# Patient Record
Sex: FEMALE | Race: WHITE | URBAN - METROPOLITAN AREA
[De-identification: names, ages, dates, MRNs, and addresses within clinical notes are randomized per-mention and may not be internally consistent; named-entity substitution may affect disease eponyms.]

---

## 2018-12-03 ENCOUNTER — APPOINTMENT (RX ONLY)
Dept: URBAN - METROPOLITAN AREA CLINIC 16 | Facility: CLINIC | Age: 58
Setting detail: DERMATOLOGY
End: 2018-12-03

## 2018-12-03 DIAGNOSIS — L82.1 OTHER SEBORRHEIC KERATOSIS: ICD-10-CM

## 2018-12-03 DIAGNOSIS — Z85.828 PERSONAL HISTORY OF OTHER MALIGNANT NEOPLASM OF SKIN: ICD-10-CM

## 2018-12-03 PROBLEM — D48.5 NEOPLASM OF UNCERTAIN BEHAVIOR OF SKIN: Status: ACTIVE | Noted: 2018-12-03

## 2018-12-03 PROBLEM — L57.0 ACTINIC KERATOSIS: Status: ACTIVE | Noted: 2018-12-03

## 2018-12-03 PROCEDURE — ? COUNSELING

## 2018-12-03 PROCEDURE — 11100: CPT

## 2018-12-03 PROCEDURE — 99202 OFFICE O/P NEW SF 15 MIN: CPT | Mod: 25

## 2018-12-03 PROCEDURE — ? DIAGNOSIS COMMENT

## 2018-12-03 PROCEDURE — ? BIOPSY BY SHAVE METHOD

## 2018-12-03 ASSESSMENT — LOCATION SIMPLE DESCRIPTION DERM: LOCATION SIMPLE: RIGHT CHEEK

## 2018-12-03 ASSESSMENT — LOCATION DETAILED DESCRIPTION DERM: LOCATION DETAILED: RIGHT MEDIAL MALAR CHEEK

## 2018-12-03 ASSESSMENT — LOCATION ZONE DERM: LOCATION ZONE: FACE

## 2018-12-03 NOTE — PROCEDURE: BIOPSY BY SHAVE METHOD
Anesthesia Type: 1% lidocaine with epinephrine
Anesthesia Volume In Cc (Will Not Render If 0): 0.7
Lab: 6
Type Of Destruction Used: Curettage
Billing Type: Third-Party Bill
Biopsy Method: 15 blade
Notification Instructions: Patient will be notified of biopsy results. However, patient instructed to call the office if not contacted within 2 weeks.
Curettage Text: The wound bed was treated with curettage after the biopsy was performed.
Biopsy Type: H and E
Post-Care Instructions: I reviewed with the patient in detail post-care instructions. Patient is to keep the biopsy site dry overnight, and then apply Vaseline/Aquaphor followed by bandage daily until healed.
Additional Anesthesia Volume In Cc (Will Not Render If 0): 0
Dressing: bandage
Depth Of Biopsy: dermis
Electrodesiccation Text: The wound bed was treated with electrodesiccation after the biopsy was performed.
Silver Nitrate Text: The wound bed was treated with silver nitrate after the biopsy was performed.
Hemostasis: Drysol
Wound Care: Petrolatum
Cryotherapy Text: The wound bed was treated with cryotherapy after the biopsy was performed.
Was A Bandage Applied: Yes
Destruction After The Procedure: No
Consent: Written consent was obtained and risks were reviewed including but not limited to scarring, infection, bleeding, scabbing, incomplete removal, nerve damage and allergy to anesthesia.
Electrodesiccation And Curettage Text: The wound bed was treated with electrodesiccation and curettage after the biopsy was performed.
Detail Level: Detailed

## 2020-04-17 ENCOUNTER — APPOINTMENT (RX ONLY)
Dept: URBAN - METROPOLITAN AREA CLINIC 16 | Facility: CLINIC | Age: 60
Setting detail: DERMATOLOGY
End: 2020-04-17

## 2020-04-17 DIAGNOSIS — D485 NEOPLASM OF UNCERTAIN BEHAVIOR OF SKIN: ICD-10-CM

## 2020-04-17 PROBLEM — D48.5 NEOPLASM OF UNCERTAIN BEHAVIOR OF SKIN: Status: ACTIVE | Noted: 2020-04-17

## 2020-04-17 PROCEDURE — ? COUNSELING

## 2020-04-17 PROCEDURE — 99213 OFFICE O/P EST LOW 20 MIN: CPT

## 2020-04-17 PROCEDURE — ? DIAGNOSIS COMMENT

## 2020-04-17 PROCEDURE — ? ADDITIONAL NOTES

## 2020-04-17 ASSESSMENT — LOCATION ZONE DERM: LOCATION ZONE: TRUNK

## 2020-04-17 ASSESSMENT — LOCATION SIMPLE DESCRIPTION DERM: LOCATION SIMPLE: RIGHT BACK

## 2020-04-17 ASSESSMENT — LOCATION DETAILED DESCRIPTION DERM: LOCATION DETAILED: RIGHT SUPERIOR LATERAL UPPER BACK

## 2020-04-17 NOTE — PROCEDURE: DIAGNOSIS COMMENT
Comment: Patient counseled on ddx based on telehealth images, ddx: irritated angioma vs irritated nevus vs basal cell carcinoma vs less likely amelanotic melanoma. Recommend follow-up in clinic for biopsy - schedule for 10:30am on April 24.
Detail Level: Simple

## 2020-04-24 ENCOUNTER — APPOINTMENT (RX ONLY)
Dept: URBAN - METROPOLITAN AREA CLINIC 16 | Facility: CLINIC | Age: 60
Setting detail: DERMATOLOGY
End: 2020-04-24

## 2020-04-24 DIAGNOSIS — Z87.2 PERSONAL HISTORY OF DISEASES OF THE SKIN AND SUBCUTANEOUS TISSUE: ICD-10-CM

## 2020-04-24 DIAGNOSIS — L82.1 OTHER SEBORRHEIC KERATOSIS: ICD-10-CM

## 2020-04-24 DIAGNOSIS — L73.8 OTHER SPECIFIED FOLLICULAR DISORDERS: ICD-10-CM

## 2020-04-24 DIAGNOSIS — Z85.828 PERSONAL HISTORY OF OTHER MALIGNANT NEOPLASM OF SKIN: ICD-10-CM

## 2020-04-24 DIAGNOSIS — D485 NEOPLASM OF UNCERTAIN BEHAVIOR OF SKIN: ICD-10-CM

## 2020-04-24 PROBLEM — D48.5 NEOPLASM OF UNCERTAIN BEHAVIOR OF SKIN: Status: ACTIVE | Noted: 2020-04-24

## 2020-04-24 PROCEDURE — ? DIAGNOSIS COMMENT

## 2020-04-24 PROCEDURE — ? BIOPSY BY SHAVE METHOD

## 2020-04-24 PROCEDURE — 11102 TANGNTL BX SKIN SINGLE LES: CPT

## 2020-04-24 PROCEDURE — ? FULL BODY SKIN EXAM - DECLINED

## 2020-04-24 PROCEDURE — ? SUNSCREEN RECOMMENDATIONS

## 2020-04-24 PROCEDURE — 99213 OFFICE O/P EST LOW 20 MIN: CPT | Mod: 25

## 2020-04-24 PROCEDURE — ? COUNSELING

## 2020-04-24 ASSESSMENT — LOCATION DETAILED DESCRIPTION DERM
LOCATION DETAILED: NASAL DORSUM
LOCATION DETAILED: RIGHT SUPERIOR UPPER BACK
LOCATION DETAILED: RIGHT MEDIAL MALAR CHEEK
LOCATION DETAILED: RIGHT MEDIAL INFERIOR EYELID
LOCATION DETAILED: RIGHT SUPERIOR MEDIAL MALAR CHEEK

## 2020-04-24 ASSESSMENT — LOCATION SIMPLE DESCRIPTION DERM
LOCATION SIMPLE: RIGHT UPPER BACK
LOCATION SIMPLE: NOSE
LOCATION SIMPLE: RIGHT INFERIOR EYELID
LOCATION SIMPLE: RIGHT CHEEK

## 2020-04-24 ASSESSMENT — LOCATION ZONE DERM
LOCATION ZONE: EYELID
LOCATION ZONE: NOSE
LOCATION ZONE: TRUNK
LOCATION ZONE: FACE

## 2020-04-24 NOTE — PROCEDURE: FULL BODY SKIN EXAM - DECLINED
Body Of Note (Please Add Your Own Text Here): Patient declined TBSE
Detail Level: Simple
Price (Do Not Change): 0.00
Instructions: This plan will send the code FBSD to the PM system.  DO NOT or CHANGE the price.

## 2020-04-24 NOTE — PROCEDURE: DIAGNOSIS COMMENT
Detail Level: Simple
Comment: History of few BCCs and SCC, most recently BCC nasal dorsum s/p Mohs 1/2019. \\n\\nPatient declined TBSE today. Reviewed that I recommend a TBSE follow-up appointment given prior history of skin cancer, at risk for additional development of skin cancer.
Comment: History of dysplastic nevi: \\nModerately atypical sacrum 2010\\nModerately atypical right axilla 2008 \\nMildly atypical left upper abdomen 2008 \\n\\nPatient declined TBSE today. Reviewed increased risk for melanoma given history of atypical nevi.

## 2020-04-30 ENCOUNTER — APPOINTMENT (RX ONLY)
Dept: URBAN - METROPOLITAN AREA CLINIC 16 | Facility: CLINIC | Age: 60
Setting detail: DERMATOLOGY
End: 2020-04-30

## 2020-04-30 DIAGNOSIS — L71.8 OTHER ROSACEA: ICD-10-CM

## 2020-04-30 PROCEDURE — ? DIAGNOSIS COMMENT

## 2020-04-30 PROCEDURE — ? PRESCRIPTION

## 2020-04-30 PROCEDURE — ? COUNSELING

## 2020-04-30 RX ORDER — METRONIDAZOLE 7.5 MG/G
CREAM TOPICAL
Qty: 1 | Refills: 5 | Status: ERX | COMMUNITY
Start: 2020-04-30

## 2020-04-30 RX ADMIN — METRONIDAZOLE: 7.5 CREAM TOPICAL at 00:00

## 2020-10-06 ENCOUNTER — APPOINTMENT (RX ONLY)
Dept: URBAN - METROPOLITAN AREA CLINIC 16 | Facility: CLINIC | Age: 60
Setting detail: DERMATOLOGY
End: 2020-10-06

## 2020-10-06 DIAGNOSIS — L57.0 ACTINIC KERATOSIS: ICD-10-CM

## 2020-10-06 DIAGNOSIS — L81.4 OTHER MELANIN HYPERPIGMENTATION: ICD-10-CM

## 2020-10-06 DIAGNOSIS — Z87.2 PERSONAL HISTORY OF DISEASES OF THE SKIN AND SUBCUTANEOUS TISSUE: ICD-10-CM

## 2020-10-06 DIAGNOSIS — L82.1 OTHER SEBORRHEIC KERATOSIS: ICD-10-CM

## 2020-10-06 DIAGNOSIS — Z85.828 PERSONAL HISTORY OF OTHER MALIGNANT NEOPLASM OF SKIN: ICD-10-CM

## 2020-10-06 DIAGNOSIS — L90.5 SCAR CONDITIONS AND FIBROSIS OF SKIN: ICD-10-CM

## 2020-10-06 DIAGNOSIS — L73.8 OTHER SPECIFIED FOLLICULAR DISORDERS: ICD-10-CM

## 2020-10-06 DIAGNOSIS — D22 MELANOCYTIC NEVI: ICD-10-CM

## 2020-10-06 PROBLEM — D22.5 MELANOCYTIC NEVI OF TRUNK: Status: ACTIVE | Noted: 2020-10-06

## 2020-10-06 PROCEDURE — ? SUNSCREEN RECOMMENDATIONS

## 2020-10-06 PROCEDURE — ? COUNSELING

## 2020-10-06 PROCEDURE — ? FULL BODY SKIN EXAM

## 2020-10-06 PROCEDURE — 17003 DESTRUCT PREMALG LES 2-14: CPT

## 2020-10-06 PROCEDURE — ? MEDICATION COUNSELING

## 2020-10-06 PROCEDURE — 17000 DESTRUCT PREMALG LESION: CPT

## 2020-10-06 PROCEDURE — ? DIAGNOSIS COMMENT

## 2020-10-06 PROCEDURE — 99214 OFFICE O/P EST MOD 30 MIN: CPT | Mod: 25

## 2020-10-06 PROCEDURE — ? PRESCRIPTION

## 2020-10-06 PROCEDURE — ? LIQUID NITROGEN

## 2020-10-06 RX ORDER — TRETIONIN 0.5 MG/G
CREAM TOPICAL
Qty: 1 | Refills: 2 | Status: ERX | COMMUNITY
Start: 2020-10-06

## 2020-10-06 RX ADMIN — TRETIONIN: 0.5 CREAM TOPICAL at 00:00

## 2020-10-06 ASSESSMENT — LOCATION DETAILED DESCRIPTION DERM
LOCATION DETAILED: RIGHT SUPERIOR UPPER BACK
LOCATION DETAILED: LEFT POSTERIOR SHOULDER
LOCATION DETAILED: RIGHT MEDIAL MALAR CHEEK
LOCATION DETAILED: RIGHT SUPERIOR MEDIAL MALAR CHEEK
LOCATION DETAILED: NASAL DORSUM
LOCATION DETAILED: RIGHT MEDIAL INFERIOR EYELID
LOCATION DETAILED: RIGHT CENTRAL MALAR CHEEK
LOCATION DETAILED: LEFT LATERAL EYEBROW

## 2020-10-06 ASSESSMENT — LOCATION SIMPLE DESCRIPTION DERM
LOCATION SIMPLE: LEFT EYEBROW
LOCATION SIMPLE: RIGHT CHEEK
LOCATION SIMPLE: RIGHT UPPER BACK
LOCATION SIMPLE: RIGHT INFERIOR EYELID
LOCATION SIMPLE: LEFT SHOULDER
LOCATION SIMPLE: NOSE

## 2020-10-06 ASSESSMENT — LOCATION ZONE DERM
LOCATION ZONE: ARM
LOCATION ZONE: FACE
LOCATION ZONE: TRUNK
LOCATION ZONE: NOSE
LOCATION ZONE: EYELID

## 2020-10-06 NOTE — PROCEDURE: MIPS QUALITY
Quality 402: Tobacco Use And Help With Quitting Among Adolescents: Patient screened for tobacco and never smoked
Quality 110: Preventive Care And Screening: Influenza Immunization: Influenza Immunization Administered during Influenza season
Quality 226: Preventive Care And Screening: Tobacco Use: Screening And Cessation Intervention: Patient screened for tobacco use and is an ex/non-smoker
Quality 130: Documentation Of Current Medications In The Medical Record: Current Medications Documented
Detail Level: Detailed

## 2020-10-06 NOTE — PROCEDURE: FULL BODY SKIN EXAM
Instructions: This plan will send the code FBSE to the PM system.  DO NOT or CHANGE the price.
Price (Do Not Change): 0.00
Body Of Note (Please Add Your Own Text Here): S/p TBSE today (except genitals - patient declined)
Detail Level: Simple

## 2020-10-06 NOTE — PROCEDURE: LIQUID NITROGEN
Render Post-Care Instructions In Note?: no
Duration Of Freeze Thaw-Cycle (Seconds): 7
Post-Care Instructions: I reviewed with the patient in detail post-care instructions. Patient is to wear sun protection and avoid picking at any of the treated lesions. Patient may apply Vaseline to crusted or scabbing areas.
Detail Level: Simple
Consent: The patient's consent was obtained including but not limited to risks of crusting, scabbing, blistering, scarring, darker or lighter pigmentary change, recurrence, incomplete removal and infection.
Number Of Freeze-Thaw Cycles: 1 freeze-thaw cycle

## 2020-10-06 NOTE — PROCEDURE: MEDICATION COUNSELING
Xelmanuelz Pregnancy And Lactation Text: This medication is Pregnancy Category D and is not considered safe during pregnancy.  The risk during breast feeding is also uncertain.

## 2020-10-06 NOTE — PROCEDURE: MEDICATION COUNSELING
Plan: Per pt’s request labs drawn today because of family history of cancers Detail Level: Zone Continue Regimen: Augmented betamethasone ointment BID Modify Regimen: Xyzal 5mg QHS (was BID) \\nFexofinadine 180mg QHS (was BID) \\nStopped BID due to tiredness Otezla Pregnancy And Lactation Text: This medication is Pregnancy Category C and it isn't known if it is safe during pregnancy. It is unknown if it is excreted in breast milk.

## 2020-10-06 NOTE — PROCEDURE: DIAGNOSIS COMMENT
Detail Level: Simple
Comment: History of few BCCs and SCC, most recently BCC nasal dorsum s/p Mohs 1/2019. \\n\\nS/p TBSE today, recommend Q6 month
Comment: History of dysplastic nevi: \\nModerately atypical sacrum 2010\\nModerately atypical right axilla 2008 \\nMildly atypical left upper abdomen 2008 \\n\\nReviewed increased risk for melanoma given history of atypical nevi.
Comment: Patient elects for treatment with cryosurgery over 5% fluorouracil
Comment: Patient unhappy with appearance of scar from prior Mohs surgery. Discussed treatment options in detail today including silicone gel sheeting, topical tretinoin cream, laser therapy. Patient elects for silicone gel sheeting and tretinoin cream.

## 2020-11-10 ENCOUNTER — APPOINTMENT (RX ONLY)
Dept: URBAN - METROPOLITAN AREA CLINIC 16 | Facility: CLINIC | Age: 60
Setting detail: DERMATOLOGY
End: 2020-11-10

## 2020-11-10 DIAGNOSIS — L90.5 SCAR CONDITIONS AND FIBROSIS OF SKIN: ICD-10-CM

## 2020-11-10 PROCEDURE — ? DIAGNOSIS COMMENT

## 2020-11-10 PROCEDURE — ? PRESCRIPTION

## 2020-11-10 RX ORDER — TRETIONIN 0.5 MG/G
CREAM TOPICAL
Qty: 1 | Refills: 2 | Status: ERX

## 2021-04-27 ENCOUNTER — APPOINTMENT (RX ONLY)
Dept: URBAN - METROPOLITAN AREA CLINIC 16 | Facility: CLINIC | Age: 61
Setting detail: DERMATOLOGY
End: 2021-04-27

## 2021-04-27 DIAGNOSIS — L73.8 OTHER SPECIFIED FOLLICULAR DISORDERS: ICD-10-CM

## 2021-04-27 DIAGNOSIS — L81.4 OTHER MELANIN HYPERPIGMENTATION: ICD-10-CM

## 2021-04-27 DIAGNOSIS — D22 MELANOCYTIC NEVI: ICD-10-CM

## 2021-04-27 DIAGNOSIS — Z87.2 PERSONAL HISTORY OF DISEASES OF THE SKIN AND SUBCUTANEOUS TISSUE: ICD-10-CM

## 2021-04-27 DIAGNOSIS — Z85.828 PERSONAL HISTORY OF OTHER MALIGNANT NEOPLASM OF SKIN: ICD-10-CM

## 2021-04-27 DIAGNOSIS — L82.1 OTHER SEBORRHEIC KERATOSIS: ICD-10-CM

## 2021-04-27 PROBLEM — D22.5 MELANOCYTIC NEVI OF TRUNK: Status: ACTIVE | Noted: 2021-04-27

## 2021-04-27 PROCEDURE — ? COUNSELING

## 2021-04-27 PROCEDURE — 99213 OFFICE O/P EST LOW 20 MIN: CPT

## 2021-04-27 PROCEDURE — ? DIAGNOSIS COMMENT

## 2021-04-27 PROCEDURE — ? FULL BODY SKIN EXAM

## 2021-04-27 PROCEDURE — ? SUNSCREEN RECOMMENDATIONS

## 2021-04-27 ASSESSMENT — LOCATION DETAILED DESCRIPTION DERM
LOCATION DETAILED: RIGHT MEDIAL MALAR CHEEK
LOCATION DETAILED: RIGHT MEDIAL INFERIOR EYELID
LOCATION DETAILED: RIGHT SUPERIOR UPPER BACK
LOCATION DETAILED: NASAL DORSUM
LOCATION DETAILED: RIGHT SUPERIOR MEDIAL MALAR CHEEK
LOCATION DETAILED: LEFT POSTERIOR SHOULDER

## 2021-04-27 ASSESSMENT — LOCATION ZONE DERM
LOCATION ZONE: NOSE
LOCATION ZONE: ARM
LOCATION ZONE: FACE
LOCATION ZONE: EYELID
LOCATION ZONE: TRUNK

## 2021-04-27 ASSESSMENT — LOCATION SIMPLE DESCRIPTION DERM
LOCATION SIMPLE: RIGHT INFERIOR EYELID
LOCATION SIMPLE: RIGHT UPPER BACK
LOCATION SIMPLE: LEFT SHOULDER
LOCATION SIMPLE: NOSE
LOCATION SIMPLE: RIGHT CHEEK

## 2021-04-27 NOTE — PROCEDURE: DIAGNOSIS COMMENT
Detail Level: Simple
Comment: History of few BCCs and SCC, most recently BCC nasal dorsum s/p Mohs 1/2019. \\n\\nS/p TBSE today, discussed yearly vs Q6 month - patient elects for yearly, advised to RTC sooner for any new, changing or concerning lesions
Comment: History of dysplastic nevi: \\nModerately atypical sacrum 2010\\nModerately atypical right axilla 2008 \\nMildly atypical left upper abdomen 2008 \\n\\nReviewed increased risk for melanoma given history of atypical nevi.

## 2022-02-23 NOTE — PROCEDURE: MEDICATION COUNSELING
Letty Metzger is here today for Preventative Care (est care/ back pain ) and Office Visit    Concerns/symptoms  : knee pain ( right) and right foot pain all the time.     Declined cervical cancer exam   Declined all vaccines   declined colonoscopy / fobt       Medications: medications verified and updated  Refills needed today? No     Tobacco history: verified  Advanced Directives: No not on file, not interested.  BP greater than 140/90? No    Patient would like communication of their results via:    Home Phone: [phone] (home)  Okay to leave a message containing results? No  Preferred language:  English.    Health Maintenance Due   Topic Date Due   • COVID-19 Vaccine (1) Never done   • Depression Screening  Never done   • DTaP/Tdap/Td Vaccine (1 - Tdap) Never done   • Cervical Cancer Screen 30-64 -  Never done   • Breast Cancer Screening  Never done   • Influenza Vaccine (1) Never done   • Shingles Vaccine (1 of 2) Never done   • Colorectal Cancer Screen-  Never done      Patient is due for topics as listed above but is not proceeding with Immunization(s) COVID-19, Dtap/Tdap/Td and Influenza at this time. .    Medicare HRA:              PHQ 2:       PHQ 9:        Solaraze Pregnancy And Lactation Text: This medication is Pregnancy Category B and is considered safe. There is some data to suggest avoiding during the third trimester. It is unknown if this medication is excreted in breast milk.

## 2022-03-17 LAB
BASOPHILS ABSOLUTE: NORMAL
BASOPHILS RELATIVE PERCENT: NORMAL
EOSINOPHILS ABSOLUTE: NORMAL
EOSINOPHILS RELATIVE PERCENT: NORMAL
HCT VFR BLD CALC: NORMAL %
HEMOGLOBIN: NORMAL
LYMPHOCYTES ABSOLUTE: NORMAL
LYMPHOCYTES RELATIVE PERCENT: NORMAL
MCH RBC QN AUTO: NORMAL PG
MCHC RBC AUTO-ENTMCNC: NORMAL G/DL
MCV RBC AUTO: NORMAL FL
MONOCYTES ABSOLUTE: NORMAL
MONOCYTES RELATIVE PERCENT: NORMAL
NEUTROPHILS ABSOLUTE: NORMAL
NEUTROPHILS RELATIVE PERCENT: NORMAL
PLATELET # BLD: NORMAL 10*3/UL
PMV BLD AUTO: NORMAL FL
RBC # BLD: NORMAL 10*6/UL
WBC # BLD: NORMAL 10*3/UL

## 2022-04-25 ENCOUNTER — APPOINTMENT (RX ONLY)
Dept: URBAN - METROPOLITAN AREA CLINIC 16 | Facility: CLINIC | Age: 62
Setting detail: DERMATOLOGY
End: 2022-04-25

## 2022-04-25 DIAGNOSIS — D22 MELANOCYTIC NEVI: ICD-10-CM

## 2022-04-25 DIAGNOSIS — Z87.2 PERSONAL HISTORY OF DISEASES OF THE SKIN AND SUBCUTANEOUS TISSUE: ICD-10-CM

## 2022-04-25 DIAGNOSIS — L82.1 OTHER SEBORRHEIC KERATOSIS: ICD-10-CM

## 2022-04-25 DIAGNOSIS — Z85.828 PERSONAL HISTORY OF OTHER MALIGNANT NEOPLASM OF SKIN: ICD-10-CM

## 2022-04-25 DIAGNOSIS — L73.8 OTHER SPECIFIED FOLLICULAR DISORDERS: ICD-10-CM

## 2022-04-25 DIAGNOSIS — L71.8 OTHER ROSACEA: ICD-10-CM | Status: WELL CONTROLLED

## 2022-04-25 DIAGNOSIS — L81.4 OTHER MELANIN HYPERPIGMENTATION: ICD-10-CM

## 2022-04-25 DIAGNOSIS — L57.0 ACTINIC KERATOSIS: ICD-10-CM

## 2022-04-25 PROBLEM — D22.5 MELANOCYTIC NEVI OF TRUNK: Status: ACTIVE | Noted: 2022-04-25

## 2022-04-25 PROCEDURE — ? COUNSELING

## 2022-04-25 PROCEDURE — 17000 DESTRUCT PREMALG LESION: CPT

## 2022-04-25 PROCEDURE — ? LIQUID NITROGEN

## 2022-04-25 PROCEDURE — 99213 OFFICE O/P EST LOW 20 MIN: CPT | Mod: 25

## 2022-04-25 PROCEDURE — ? FULL BODY SKIN EXAM

## 2022-04-25 PROCEDURE — 17003 DESTRUCT PREMALG LES 2-14: CPT

## 2022-04-25 PROCEDURE — ? SUNSCREEN RECOMMENDATIONS

## 2022-04-25 PROCEDURE — ? PRESCRIPTION

## 2022-04-25 PROCEDURE — ? DIAGNOSIS COMMENT

## 2022-04-25 PROCEDURE — ? TREATMENT REGIMEN

## 2022-04-25 RX ORDER — METRONIDAZOLE 7.5 MG/G
CREAM TOPICAL
Qty: 45 | Refills: 3 | Status: ERX | COMMUNITY
Start: 2022-04-25

## 2022-04-25 RX ADMIN — METRONIDAZOLE 1: 7.5 CREAM TOPICAL at 00:00

## 2022-04-25 ASSESSMENT — LOCATION DETAILED DESCRIPTION DERM
LOCATION DETAILED: RIGHT SUPERIOR MEDIAL MALAR CHEEK
LOCATION DETAILED: RIGHT MEDIAL MALAR CHEEK
LOCATION DETAILED: LEFT MEDIAL MALAR CHEEK
LOCATION DETAILED: RIGHT SUPERIOR CENTRAL MALAR CHEEK
LOCATION DETAILED: RIGHT MEDIAL INFERIOR EYELID
LOCATION DETAILED: NASAL SUPRATIP
LOCATION DETAILED: LEFT POSTERIOR SHOULDER
LOCATION DETAILED: NASAL DORSUM
LOCATION DETAILED: RIGHT SUPERIOR UPPER BACK
LOCATION DETAILED: LEFT LATERAL EYEBROW
LOCATION DETAILED: LEFT MID TEMPLE

## 2022-04-25 ASSESSMENT — LOCATION SIMPLE DESCRIPTION DERM
LOCATION SIMPLE: LEFT EYEBROW
LOCATION SIMPLE: NOSE
LOCATION SIMPLE: RIGHT UPPER BACK
LOCATION SIMPLE: LEFT SHOULDER
LOCATION SIMPLE: LEFT CHEEK
LOCATION SIMPLE: RIGHT INFERIOR EYELID
LOCATION SIMPLE: RIGHT CHEEK
LOCATION SIMPLE: LEFT TEMPLE

## 2022-04-25 ASSESSMENT — LOCATION ZONE DERM
LOCATION ZONE: NOSE
LOCATION ZONE: ARM
LOCATION ZONE: TRUNK
LOCATION ZONE: FACE
LOCATION ZONE: EYELID

## 2022-04-25 NOTE — PROCEDURE: LIQUID NITROGEN
Consent: The patient's consent was obtained including but not limited to risks of crusting, scabbing, blistering, scarring, darker or lighter pigmentary change, recurrence, incomplete removal and infection.
Duration Of Freeze Thaw-Cycle (Seconds): 7
Show Applicator Variable?: Yes
Render Note In Bullet Format When Appropriate: No
Post-Care Instructions: I reviewed with the patient in detail post-care instructions. Patient is to wear sun protection and avoid picking at any of the treated lesions. Patient may apply Vaseline to crusted or scabbing areas.
Number Of Freeze-Thaw Cycles: 1 freeze-thaw cycle
Detail Level: Simple

## 2022-04-25 NOTE — PROCEDURE: TREATMENT REGIMEN
Detail Level: Zone
Action 2: Continue
Continue Regimen: metronidazole 0.75 % topical cream \\nSig: Apply to affected area on face once daily

## 2022-04-25 NOTE — PROCEDURE: DIAGNOSIS COMMENT
Detail Level: Simple
Comment: History of few BCCs and SCC, most recently BCC nasal dorsum s/p Mohs 1/2019. \\n\\nS/p TBSE today
Comment: History of dysplastic nevi: \\nModerately atypical sacrum 2010\\nModerately atypical right axilla 2008 \\nMildly atypical left upper abdomen 2008 \\n\\nReviewed increased risk for melanoma given history of atypical nevi.
Comment: Several new actinic keratoses on exam today. Discussed treatment options, patient elects for treatment with cryo-surgery over 5% fluorouracil cream. Consider 5% fluorouracil cream to face in fall pending development of additional actinic keratoses
Render Risk Assessment In Note?: no
Comment: Refill request for metronidazole

## 2022-11-04 ENCOUNTER — OFFICE VISIT (OUTPATIENT)
Dept: PRIMARY CARE CLINIC | Age: 62
End: 2022-11-04
Payer: COMMERCIAL

## 2022-11-04 VITALS
HEIGHT: 62 IN | OXYGEN SATURATION: 99 % | WEIGHT: 156.3 LBS | HEART RATE: 68 BPM | BODY MASS INDEX: 28.76 KG/M2 | DIASTOLIC BLOOD PRESSURE: 70 MMHG | TEMPERATURE: 97 F | SYSTOLIC BLOOD PRESSURE: 120 MMHG | RESPIRATION RATE: 20 BRPM

## 2022-11-04 DIAGNOSIS — L71.9 ROSACEA: ICD-10-CM

## 2022-11-04 DIAGNOSIS — Z76.89 ENCOUNTER TO ESTABLISH CARE: ICD-10-CM

## 2022-11-04 DIAGNOSIS — Z00.00 ENCOUNTER FOR WELL ADULT EXAM WITHOUT ABNORMAL FINDINGS: Primary | ICD-10-CM

## 2022-11-04 DIAGNOSIS — Z12.31 ENCOUNTER FOR SCREENING MAMMOGRAM FOR BREAST CANCER: ICD-10-CM

## 2022-11-04 PROCEDURE — G8482 FLU IMMUNIZE ORDER/ADMIN: HCPCS | Performed by: PHYSICIAN ASSISTANT

## 2022-11-04 PROCEDURE — 99386 PREV VISIT NEW AGE 40-64: CPT | Performed by: PHYSICIAN ASSISTANT

## 2022-11-04 RX ORDER — METRONIDAZOLE 7.5 MG/G
GEL TOPICAL
COMMUNITY

## 2022-11-04 SDOH — ECONOMIC STABILITY: FOOD INSECURITY: WITHIN THE PAST 12 MONTHS, THE FOOD YOU BOUGHT JUST DIDN'T LAST AND YOU DIDN'T HAVE MONEY TO GET MORE.: NEVER TRUE

## 2022-11-04 SDOH — ECONOMIC STABILITY: FOOD INSECURITY: WITHIN THE PAST 12 MONTHS, YOU WORRIED THAT YOUR FOOD WOULD RUN OUT BEFORE YOU GOT MONEY TO BUY MORE.: NEVER TRUE

## 2022-11-04 ASSESSMENT — PATIENT HEALTH QUESTIONNAIRE - PHQ9
SUM OF ALL RESPONSES TO PHQ QUESTIONS 1-9: 0
SUM OF ALL RESPONSES TO PHQ QUESTIONS 1-9: 0
2. FEELING DOWN, DEPRESSED OR HOPELESS: 0
SUM OF ALL RESPONSES TO PHQ QUESTIONS 1-9: 0
SUM OF ALL RESPONSES TO PHQ QUESTIONS 1-9: 0
1. LITTLE INTEREST OR PLEASURE IN DOING THINGS: 0
SUM OF ALL RESPONSES TO PHQ9 QUESTIONS 1 & 2: 0

## 2022-11-04 ASSESSMENT — SOCIAL DETERMINANTS OF HEALTH (SDOH)
HOW HARD IS IT FOR YOU TO PAY FOR THE VERY BASICS LIKE FOOD, HOUSING, MEDICAL CARE, AND HEATING?: NOT HARD AT ALL
HOW HARD IS IT FOR YOU TO PAY FOR THE VERY BASICS LIKE FOOD, HOUSING, MEDICAL CARE, AND HEATING?: NOT HARD AT ALL

## 2022-11-04 ASSESSMENT — ENCOUNTER SYMPTOMS
NAUSEA: 0
SHORTNESS OF BREATH: 0
COUGH: 0
SORE THROAT: 0
VOMITING: 0
DIARRHEA: 0
ABDOMINAL PAIN: 0

## 2022-11-04 NOTE — PROGRESS NOTES
Well Adult Note  Name: Eloy Gamez Date: 2022   MRN: [id number] Sex: Female   Age: 57 y.o. Ethnicity: Non- / Non    : 1960 Race: White (non-)      Jerri Morales is here for well adult exam.  History:  patient presents to [facility]. 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 She has scoliosis, for which she does her own PT and had minimal sx. She has no acute complaints. She has a colonoscopy scheduled on   - [facility]. She is due for a mammogram.       Review of Systems   Constitutional:  Negative for chills and fever. HENT:  Negative for congestion, ear pain and sore throat. Respiratory:  Negative for cough and shortness of breath. Cardiovascular:  Negative for chest pain and palpitations. Gastrointestinal:  Negative for abdominal pain, diarrhea, nausea and vomiting. Genitourinary:  Negative for dysuria, frequency and urgency. Musculoskeletal:  Negative for myalgias. Skin:  Negative for rash. Neurological:  Negative for weakness and headaches. No Known Allergies      Prior to Visit Medications    Medication Sig Taking?  Authorizing Provider   metroNIDAZOLE (METROGEL) 0.75 % gel metronidazole 0.75 % topical gel Yes Historical Provider, MD         Past Medical History:   Diagnosis Date    Acquired scoliosis        Past Surgical History:   Procedure Laterality Date    APPENDECTOMY       SECTION      SKIN CANCER DESTRUCTION           Family History   Problem Relation Age of Onset    Alzheimer's Disease Father     Pseudotumor Cerebri Sister        Social History     Tobacco Use    Smoking status: Never     Passive exposure: Never    Smokeless tobacco: Never   Vaping Use    Vaping Use: Never used   Substance Use Topics    Alcohol use: Never       Objective   /70 (Site: Left Upper Arm, Position: Sitting, Cuff Size: Medium Adult)   Pulse 68   Temp 97 °F (36.1 °C) (Temporal)   Resp 20   Ht 5' 2.32\" (1.583 m)   Wt 156 lb 4.8 oz (70.9 kg) SpO2 99%   BMI 28.29 kg/m²   Wt Readings from Last 3 Encounters:   11/04/22 156 lb 4.8 oz (70.9 kg)     There were no vitals filed for this visit. Physical Exam  Constitutional:       General: She is not in acute distress. Appearance: Normal appearance. She is well-developed. HENT:      Head: Normocephalic and atraumatic. Right Ear: External ear normal.      Left Ear: External ear normal.      Nose: Nose normal.   Eyes:      General: No scleral icterus. Conjunctiva/sclera: Conjunctivae normal.      Pupils: Pupils are equal, round, and reactive to light. Neck:      Thyroid: No thyromegaly. Cardiovascular:      Rate and Rhythm: Normal rate and regular rhythm. Heart sounds: Normal heart sounds. No murmur heard. Pulmonary:      Effort: Pulmonary effort is normal. No accessory muscle usage or respiratory distress. Breath sounds: Normal breath sounds. No wheezing. Musculoskeletal:         General: Deformity (thoracolumbar scoliosis) present. Normal range of motion. Cervical back: Normal range of motion and neck supple. Right lower leg: No edema. Left lower leg: No edema. Skin:     General: Skin is warm and dry. Findings: No rash. Neurological:      Mental Status: She is alert and oriented to person, place, and time. Deep Tendon Reflexes: Reflexes are normal and symmetric. Psychiatric:         Mood and Affect: Mood and affect normal.         Speech: Speech normal.         Behavior: Behavior normal.         Assessment   Plan   1. Encounter for well adult exam without abnormal findings  2. Rosacea  3. Encounter to establish care  4. Encounter for screening mammogram for breast cancer  -     [facility] KAMILAH DIGITAL SCREEN BILATERAL;  Future         Personalized Preventive Plan   Current Health Maintenance Status  Immunization History   Administered Date(s) Administered    COVID-19, MODERNA BLUE border, Primary or Immunocompromised, (age 12y+), IM, 100 mcg/0.5mL 12/18/2020, 01/08/2021    COVID-19, PFIZER PURPLE top, DILUTE for use, (age 15 y+), 30mcg/0.3mL 12/13/2021    Influenza, FLUARIX, FLULAVAL, FLUZONE (age 10 mo+) AND AFLURIA, (age 1 y+), PF, 0.5mL 10/24/2022    Influenza, FLUBLOK, (age 25 y+), PF, 0.5mL 10/12/2020, 11/04/2021, 11/05/2021    Tdap (Boostrix, Adacel) 04/23/2018        Health Maintenance   Topic Date Due    Depression Screen  Never done    HIV screen  Never done    Hepatitis C screen  Never done    Cervical cancer screen  Never done    Diabetes screen  Never done    Lipids  Never done    Shingles vaccine (1 of 2) Never done    Breast cancer screen  04/06/2020    COVID-19 Vaccine (4 - Booster) 02/07/2022    Colorectal Cancer Screen  07/01/2022    DTaP/Tdap/Td vaccine (2 - Td or Tdap) 04/23/2028    Flu vaccine  Completed    Hepatitis A vaccine  Aged Out    Hib vaccine  Aged Out    Meningococcal (ACWY) vaccine  Aged Out    Pneumococcal 0-64 years Vaccine  Aged Out     Recommendations for [employer] Due: see orders and patient instructions/AVS.    Return in about 1 year (around 11/4/2023).

## 2022-11-04 NOTE — PATIENT INSTRUCTIONS
Well Visit, Women 48 to 72: Care Instructions  Overview     Well visits can help you stay healthy. Your doctor has checked your overall health and may have suggested ways to take good care of yourself. Your doctor also may have recommended tests. At home, you can help prevent illness with healthy eating, regular exercise, and other steps. Follow-up care is a key part of your treatment and safety. Be sure to make and go to all appointments, and call your doctor if you are having problems. It's also a good idea to know your test results and keep a list of the medicines you take. How can you care for yourself at home? Get screening tests that you and your doctor decide on. Screening helps find diseases before any symptoms appear. Eat healthy foods. Choose fruits, vegetables, whole grains, protein, and low-fat dairy foods. Limit fat, especially saturated fat. Reduce salt in your diet. Limit alcohol. Have no more than 1 drink a day or 7 drinks a week. Get at least 30 minutes of exercise on most days of the week. Walking is a good choice. You also may want to do other activities, such as running, swimming, cycling, or playing tennis or team sports. Reach and stay at a healthy weight. This will lower your risk for many problems, such as obesity, diabetes, heart disease, and high blood pressure. Do not smoke. Smoking can make health problems worse. If you need help quitting, talk to your doctor about stop-smoking programs and medicines. These can increase your chances of quitting for good. Care for your mental health. It is easy to get weighed down by worry and stress. Learn strategies to manage stress, like deep breathing and mindfulness, and stay connected with your family and community. If you find you often feel sad or hopeless, talk with your doctor. Treatment can help. Talk to your doctor about whether you have any risk factors for sexually transmitted infections (STIs).  You can help prevent STIs if you wait to have sex with a new partner (or partners) until you've each been tested for STIs. It also helps if you use condoms (male or female condoms) and if you limit your sex partners to one person who only has sex with you. Vaccines are available for some STIs. If you think you may have a problem with alcohol or drug use, talk to your doctor. This includes prescription medicines (such as amphetamines and opioids) and illegal drugs (such as cocaine and methamphetamine). Your doctor can help you figure out what type of treatment is best for you. Protect your skin from too much sun. When you're outdoors from 10 a.m. to 4 p.m., stay in the shade or cover up with clothing and a hat with a wide brim. Wear sunglasses that block UV rays. Even when it's cloudy, put broad-spectrum sunscreen (SPF 30 or higher) on any exposed skin. See a dentist one or two times a year for checkups and to have your teeth cleaned. Wear a seat belt in the car. When should you call for help? Watch closely for changes in your health, and be sure to contact your doctor if you have any problems or symptoms that concern you. Where can you learn more? Go to [email or web address]. org and sign in to your [employer] account. Enter O222 in the [facility] box to learn more about \"Well Visit, Women 50 to 72: Care Instructions. \"     If you do not have an account, please click on the \"Sign Up Now\" link. Current as of: June 6, 2022               Content Version: 13.4  © 2006-2022 Healthwise, Incorporated. Care instructions adapted under license by [facility] ([facility]). If you have questions about a medical condition or this instruction, always ask your healthcare professional. [place] [address] any warranty or liability for your use of this information.

## 2022-12-13 ENCOUNTER — APPOINTMENT (RX ONLY)
Dept: URBAN - METROPOLITAN AREA CLINIC 16 | Facility: CLINIC | Age: 62
Setting detail: DERMATOLOGY
End: 2022-12-13

## 2022-12-13 DIAGNOSIS — L57.0 ACTINIC KERATOSIS: ICD-10-CM | Status: WORSENING

## 2022-12-13 PROCEDURE — ? SUNSCREEN RECOMMENDATIONS

## 2022-12-13 PROCEDURE — ? DIAGNOSIS COMMENT

## 2022-12-13 PROCEDURE — ? PRESCRIPTION

## 2022-12-13 PROCEDURE — ? COUNSELING

## 2022-12-13 PROCEDURE — 99214 OFFICE O/P EST MOD 30 MIN: CPT

## 2022-12-13 PROCEDURE — ? PRESCRIPTION MEDICATION MANAGEMENT

## 2022-12-13 RX ORDER — HYDROCORTISONE 25 MG/G
CREAM TOPICAL
Qty: 28 | Refills: 0 | Status: ERX | COMMUNITY
Start: 2022-12-13

## 2022-12-13 RX ORDER — FLUOROURACIL 5 MG/G
CREAM TOPICAL
Qty: 40 | Refills: 0 | Status: ERX | COMMUNITY
Start: 2022-12-13

## 2022-12-13 RX ADMIN — HYDROCORTISONE: 25 CREAM TOPICAL at 00:00

## 2022-12-13 RX ADMIN — FLUOROURACIL: 5 CREAM TOPICAL at 00:00

## 2022-12-13 ASSESSMENT — LOCATION DETAILED DESCRIPTION DERM
LOCATION DETAILED: RIGHT INFERIOR TEMPLE
LOCATION DETAILED: NASAL DORSUM
LOCATION DETAILED: LEFT CENTRAL MALAR CHEEK
LOCATION DETAILED: LEFT INFERIOR TEMPLE
LOCATION DETAILED: RIGHT MEDIAL FOREHEAD
LOCATION DETAILED: RIGHT CENTRAL MALAR CHEEK

## 2022-12-13 ASSESSMENT — LOCATION SIMPLE DESCRIPTION DERM
LOCATION SIMPLE: RIGHT CHEEK
LOCATION SIMPLE: RIGHT FOREHEAD
LOCATION SIMPLE: LEFT TEMPLE
LOCATION SIMPLE: RIGHT TEMPLE
LOCATION SIMPLE: LEFT CHEEK
LOCATION SIMPLE: NOSE

## 2022-12-13 ASSESSMENT — LOCATION ZONE DERM
LOCATION ZONE: NOSE
LOCATION ZONE: FACE

## 2022-12-13 NOTE — PROCEDURE: DIAGNOSIS COMMENT
Comment: Several new actinic keratoses on exam today. Discussed treatment options, patient elects for treatment with 5% fluorouracil cream BID x 2 weeks. Continue yearly TBSE
Render Risk Assessment In Note?: no
Detail Level: Simple

## 2022-12-13 NOTE — PROCEDURE: PRESCRIPTION MEDICATION MANAGEMENT
Initiate Treatment: Start fluorouracil 5 % topical cream. Apply to affected areas on face (forehead, temples, nose, cheeks) twice daily x 2 weeks\\n\\nStart hydrocortisone 2.5 % topical cream. Apply to any red, irritated areas on face after using fluorouracil, apply BID prn for up to 2 consecutive weeks, stop when improved
Detail Level: Zone
Render In Strict Bullet Format?: No

## 2023-02-28 ENCOUNTER — TELEPHONE (OUTPATIENT)
Dept: PRIMARY CARE CLINIC | Age: 63
End: 2023-02-28

## 2023-02-28 DIAGNOSIS — M41.20 OTHER IDIOPATHIC SCOLIOSIS, UNSPECIFIED SPINAL REGION: Primary | ICD-10-CM

## 2023-02-28 NOTE — TELEPHONE ENCOUNTER
Patient phoned requesting a skeletal scoliosis screen xray. She has had 2 done before in past and its been 2 yrs since her last one and is complaining of some back pain and wants xray to see if its getting worse. She will  order and take it to the xray facility in [place] so they can do comparisons.       Please advise

## 2023-03-06 ENCOUNTER — COMMUNITY OUTREACH (OUTPATIENT)
Dept: PRIMARY CARE CLINIC | Age: 63
End: 2023-03-06

## 2023-03-06 DIAGNOSIS — M41.20 OTHER IDIOPATHIC SCOLIOSIS, UNSPECIFIED SPINAL REGION: ICD-10-CM

## 2023-03-06 NOTE — PROGRESS NOTES
Patient's HM shows they are overdue for Mammogram, Dexa and Cervical Cancer Screening. Care Everywhere and  files searched. No results to attach to order nor HM updated.

## 2023-03-23 ENCOUNTER — TELEPHONE (OUTPATIENT)
Dept: PRIMARY CARE CLINIC | Age: 63
End: 2023-03-23

## 2023-03-23 RX ORDER — METRONIDAZOLE 500 MG/1
500 TABLET ORAL 2 TIMES DAILY
Qty: 14 TABLET | Refills: 0 | Status: SHIPPED | OUTPATIENT
Start: 2023-03-23 | End: 2023-03-30

## 2023-03-23 RX ORDER — PENICILLIN V POTASSIUM 500 MG/1
500 TABLET ORAL 3 TIMES DAILY
Qty: 21 TABLET | Refills: 0 | Status: SHIPPED | OUTPATIENT
Start: 2023-03-23

## 2023-03-23 NOTE — TELEPHONE ENCOUNTER
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     Pt feels local tenderness up under gum, until pt can get to a dentist or an oral surgeon     Also, Pt needs referral to physical therapy, for scoliosis     Sent to pharmacy, [facility] on [address]

## 2023-03-24 ENCOUNTER — TELEPHONE (OUTPATIENT)
Dept: PRIMARY CARE CLINIC | Age: 63
End: 2023-03-24

## 2023-07-13 RX ORDER — METRONIDAZOLE 7.5 MG/G
GEL TOPICAL
Qty: 45 G | Refills: 2 | Status: SHIPPED | OUTPATIENT
Start: 2023-07-13

## 2023-07-19 ENCOUNTER — APPOINTMENT (RX ONLY)
Dept: URBAN - METROPOLITAN AREA CLINIC 16 | Facility: CLINIC | Age: 63
Setting detail: DERMATOLOGY
End: 2023-07-19

## 2023-07-19 DIAGNOSIS — Z85.828 PERSONAL HISTORY OF OTHER MALIGNANT NEOPLASM OF SKIN: ICD-10-CM

## 2023-07-19 DIAGNOSIS — L57.0 ACTINIC KERATOSIS: ICD-10-CM | Status: IMPROVED

## 2023-07-19 DIAGNOSIS — L81.5 LEUKODERMA, NOT ELSEWHERE CLASSIFIED: ICD-10-CM

## 2023-07-19 DIAGNOSIS — L73.8 OTHER SPECIFIED FOLLICULAR DISORDERS: ICD-10-CM

## 2023-07-19 DIAGNOSIS — L82.1 OTHER SEBORRHEIC KERATOSIS: ICD-10-CM

## 2023-07-19 DIAGNOSIS — L71.8 OTHER ROSACEA: ICD-10-CM

## 2023-07-19 DIAGNOSIS — Z87.2 PERSONAL HISTORY OF DISEASES OF THE SKIN AND SUBCUTANEOUS TISSUE: ICD-10-CM

## 2023-07-19 DIAGNOSIS — L82.0 INFLAMED SEBORRHEIC KERATOSIS: ICD-10-CM

## 2023-07-19 DIAGNOSIS — L81.4 OTHER MELANIN HYPERPIGMENTATION: ICD-10-CM

## 2023-07-19 DIAGNOSIS — D22 MELANOCYTIC NEVI: ICD-10-CM

## 2023-07-19 PROBLEM — D22.5 MELANOCYTIC NEVI OF TRUNK: Status: ACTIVE | Noted: 2023-07-19

## 2023-07-19 PROCEDURE — ? TREATMENT REGIMEN

## 2023-07-19 PROCEDURE — ? PRESCRIPTION MEDICATION MANAGEMENT

## 2023-07-19 PROCEDURE — ? SHAVE REMOVAL

## 2023-07-19 PROCEDURE — 11310 SHAVE SKIN LESION 0.5 CM/<: CPT

## 2023-07-19 PROCEDURE — ? COUNSELING

## 2023-07-19 PROCEDURE — ? FULL BODY SKIN EXAM

## 2023-07-19 PROCEDURE — ? DIAGNOSIS COMMENT

## 2023-07-19 PROCEDURE — 99214 OFFICE O/P EST MOD 30 MIN: CPT | Mod: 25

## 2023-07-19 PROCEDURE — ? PRESCRIPTION

## 2023-07-19 PROCEDURE — 17110 DESTRUCTION B9 LES UP TO 14: CPT | Mod: 59

## 2023-07-19 PROCEDURE — ? LIQUID NITROGEN

## 2023-07-19 PROCEDURE — ? SUNSCREEN RECOMMENDATIONS

## 2023-07-19 RX ORDER — METRONIDAZOLE 7.5 MG/G
CREAM TOPICAL
Qty: 45 | Refills: 3 | Status: ERX

## 2023-07-19 ASSESSMENT — LOCATION SIMPLE DESCRIPTION DERM
LOCATION SIMPLE: RIGHT TEMPLE
LOCATION SIMPLE: RIGHT POSTERIOR UPPER ARM
LOCATION SIMPLE: LEFT CHEEK
LOCATION SIMPLE: LEFT ZYGOMA
LOCATION SIMPLE: RIGHT INFERIOR EYELID
LOCATION SIMPLE: RIGHT CHEEK
LOCATION SIMPLE: NOSE
LOCATION SIMPLE: LEFT SHOULDER
LOCATION SIMPLE: RIGHT UPPER BACK
LOCATION SIMPLE: LEFT POSTERIOR UPPER ARM
LOCATION SIMPLE: RIGHT FOREHEAD
LOCATION SIMPLE: LEFT TEMPLE

## 2023-07-19 ASSESSMENT — LOCATION ZONE DERM
LOCATION ZONE: FACE
LOCATION ZONE: EYELID
LOCATION ZONE: ARM
LOCATION ZONE: NOSE
LOCATION ZONE: TRUNK

## 2023-07-19 ASSESSMENT — LOCATION DETAILED DESCRIPTION DERM
LOCATION DETAILED: RIGHT SUPERIOR UPPER BACK
LOCATION DETAILED: RIGHT PROXIMAL POSTERIOR UPPER ARM
LOCATION DETAILED: RIGHT INFERIOR TEMPLE
LOCATION DETAILED: LEFT MEDIAL ZYGOMA
LOCATION DETAILED: RIGHT MEDIAL INFERIOR EYELID
LOCATION DETAILED: RIGHT SUPERIOR MEDIAL MALAR CHEEK
LOCATION DETAILED: NASAL DORSUM
LOCATION DETAILED: LEFT INFERIOR TEMPLE
LOCATION DETAILED: RIGHT CENTRAL MALAR CHEEK
LOCATION DETAILED: LEFT CENTRAL MALAR CHEEK
LOCATION DETAILED: RIGHT MEDIAL FOREHEAD
LOCATION DETAILED: LEFT PROXIMAL POSTERIOR UPPER ARM
LOCATION DETAILED: RIGHT MEDIAL MALAR CHEEK
LOCATION DETAILED: LEFT MID TEMPLE
LOCATION DETAILED: LEFT POSTERIOR SHOULDER
LOCATION DETAILED: NASAL SUPRATIP

## 2023-07-19 NOTE — PROCEDURE: LIQUID NITROGEN
Number Of Freeze-Thaw Cycles: 2 freeze-thaw cycles
Show Applicator Variable?: Yes
Render Note In Bullet Format When Appropriate: No
Medical Necessity Clause: This procedure was medically necessary because the lesions that were treated were:
Spray Paint Text: The liquid nitrogen was applied to the skin utilizing a spray paint frosting technique.
Consent: The patient's consent was obtained including but not limited to risks of crusting, scabbing, blistering, scarring, darker or lighter pigmentary change, recurrence, incomplete removal and infection.
Medical Necessity Information: It is in your best interest to select a reason for this procedure from the list below. All of these items fulfill various CMS LCD requirements except the new and changing color options.
Detail Level: Detailed
Duration Of Freeze Thaw-Cycle (Seconds): 7
Post-Care Instructions: I reviewed with the patient in detail post-care instructions. Patient is to wear sunprotection, and avoid picking at any of the treated lesions. Pt may apply Vaseline to crusted or scabbing areas.

## 2023-07-19 NOTE — PROCEDURE: DIAGNOSIS COMMENT
Detail Level: Simple
Comment: Refill request for metronidazole
Render Risk Assessment In Note?: no
Comment: History of few BCCs and SCC, most recently BCC nasal dorsum s/p Mohs 1/2019. \\n\\nS/p TBSE today
Comment: History of dysplastic nevi: \\nModerately atypical sacrum 2010\\nModerately atypical right axilla 2008 \\nMildly atypical left upper abdomen 2008 \\n\\nReviewed increased risk for melanoma given history of atypical nevi.
Comment: Actinic keratoses on face resolved after treatment with 5% fluorouracil cream BID x 2 weeks winter 2023. Discussed consideration for repeat field therapy this winter with 5-FU BID x 2 weeks (or daily x 3 weeks)

## 2023-07-19 NOTE — PROCEDURE: PRESCRIPTION MEDICATION MANAGEMENT
Initiate Treatment: This winter, consider repeat field therapy with fluorouracil 5 % topical cream. Apply to affected areas on face (forehead, temples, nose, cheeks) twice daily x 2 weeks or once daily x 3 weeks \\n\\Ariana needed: hydrocortisone 2.5 % topical cream. Apply to any red, irritated areas on face after using fluorouracil, apply BID prn for up to 2 consecutive weeks, stop when improved
Detail Level: Zone
Render In Strict Bullet Format?: No

## 2023-07-19 NOTE — PROCEDURE: SHAVE REMOVAL
Medical Necessity Information: It is in your best interest to select a reason for this procedure from the list below. All of these items fulfill various CMS LCD requirements except the new and changing color options.
Medical Necessity Clause: This procedure was medically necessary because the lesion that was treated was:
Lab: 6
Lab Facility: 0
Detail Level: Detailed
Was A Bandage Applied: Yes
Size Of Lesion In Cm (Required): 0.4
Depth Of Shave: dermis
Biopsy Method: Personna blade
Anesthesia Type: 1% lidocaine with epinephrine
Anesthesia Volume In Cc: 1
Hemostasis: Aluminum Chloride
Wound Care: Petrolatum
Render Path Notes In Note?: No
Consent was obtained from the patient. The risks and benefits to therapy were discussed in detail. Specifically, the risks of infection, scarring, bleeding, prolonged wound healing, incomplete removal, allergy to anesthesia, nerve injury and recurrence were addressed. Prior to the procedure, the treatment site was clearly identified and confirmed by the patient. All components of Universal Protocol/PAUSE Rule completed.
Post-Care Instructions: I reviewed with the patient in detail post-care instructions. Patient is to keep the biopsy site dry overnight, and then apply bacitracin twice daily until healed. Patient may apply hydrogen peroxide soaks to remove any crusting.
Notification Instructions: Patient will be notified of pathology results. However, patient instructed to call the office if not contacted within 2 weeks.
Billing Type: Third-Party Bill

## 2023-07-25 LAB — MAMMOGRAPHY, EXTERNAL: NORMAL

## 2023-08-10 ENCOUNTER — TELEPHONE (OUTPATIENT)
Dept: PRIMARY CARE CLINIC | Age: 63
End: 2023-08-10

## 2023-08-10 SDOH — ECONOMIC STABILITY: INCOME INSECURITY: HOW HARD IS IT FOR YOU TO PAY FOR THE VERY BASICS LIKE FOOD, HOUSING, MEDICAL CARE, AND HEATING?: NOT HARD AT ALL

## 2023-08-10 SDOH — ECONOMIC STABILITY: FOOD INSECURITY: WITHIN THE PAST 12 MONTHS, THE FOOD YOU BOUGHT JUST DIDN'T LAST AND YOU DIDN'T HAVE MONEY TO GET MORE.: NEVER TRUE

## 2023-08-10 SDOH — ECONOMIC STABILITY: HOUSING INSECURITY
IN THE LAST 12 MONTHS, WAS THERE A TIME WHEN YOU DID NOT HAVE A STEADY PLACE TO SLEEP OR SLEPT IN A SHELTER (INCLUDING NOW)?: NO

## 2023-08-10 SDOH — ECONOMIC STABILITY: FOOD INSECURITY: WITHIN THE PAST 12 MONTHS, YOU WORRIED THAT YOUR FOOD WOULD RUN OUT BEFORE YOU GOT MONEY TO BUY MORE.: NEVER TRUE

## 2023-08-10 SDOH — ECONOMIC STABILITY: TRANSPORTATION INSECURITY
IN THE PAST 12 MONTHS, HAS LACK OF TRANSPORTATION KEPT YOU FROM MEETINGS, WORK, OR FROM GETTING THINGS NEEDED FOR DAILY LIVING?: NO

## 2023-08-10 ASSESSMENT — PATIENT HEALTH QUESTIONNAIRE - PHQ9
SUM OF ALL RESPONSES TO PHQ QUESTIONS 1-9: 0
SUM OF ALL RESPONSES TO PHQ QUESTIONS 1-9: 0
1. LITTLE INTEREST OR PLEASURE IN DOING THINGS: NOT AT ALL
2. FEELING DOWN, DEPRESSED OR HOPELESS: 0
SUM OF ALL RESPONSES TO PHQ9 QUESTIONS 1 & 2: 0
1. LITTLE INTEREST OR PLEASURE IN DOING THINGS: 0
SUM OF ALL RESPONSES TO PHQ9 QUESTIONS 1 & 2: 0
SUM OF ALL RESPONSES TO PHQ QUESTIONS 1-9: 0
2. FEELING DOWN, DEPRESSED OR HOPELESS: NOT AT ALL
SUM OF ALL RESPONSES TO PHQ QUESTIONS 1-9: 0

## 2023-08-10 NOTE — TELEPHONE ENCOUNTER
Pt calling she feels a lump in lower part of left breast. She states she did have mammogram and it was negative. No sure if area was possibly missed.  Pt asking if she  needs an appt with PCP or just referral  surgeon for eval.     advise

## 2023-08-11 ENCOUNTER — OFFICE VISIT (OUTPATIENT)
Dept: PRIMARY CARE CLINIC | Age: 63
End: 2023-08-11
Payer: COMMERCIAL

## 2023-08-11 VITALS
DIASTOLIC BLOOD PRESSURE: 70 MMHG | HEIGHT: 63 IN | TEMPERATURE: 97.3 F | HEART RATE: 79 BPM | BODY MASS INDEX: 27.46 KG/M2 | RESPIRATION RATE: 18 BRPM | OXYGEN SATURATION: 100 % | SYSTOLIC BLOOD PRESSURE: 100 MMHG | WEIGHT: 155 LBS

## 2023-08-11 DIAGNOSIS — N63.23 MASS OF LOWER OUTER QUADRANT OF LEFT BREAST: Primary | ICD-10-CM

## 2023-08-11 PROCEDURE — G8400 PT W/DXA NO RESULTS DOC: HCPCS | Performed by: PHYSICIAN ASSISTANT

## 2023-08-11 PROCEDURE — G8427 DOCREV CUR MEDS BY ELIG CLIN: HCPCS | Performed by: PHYSICIAN ASSISTANT

## 2023-08-11 PROCEDURE — 1123F ACP DISCUSS/DSCN MKR DOCD: CPT | Performed by: PHYSICIAN ASSISTANT

## 2023-08-11 PROCEDURE — 99213 OFFICE O/P EST LOW 20 MIN: CPT | Performed by: PHYSICIAN ASSISTANT

## 2023-08-11 PROCEDURE — 3017F COLORECTAL CA SCREEN DOC REV: CPT | Performed by: PHYSICIAN ASSISTANT

## 2023-08-11 PROCEDURE — 1036F TOBACCO NON-USER: CPT | Performed by: PHYSICIAN ASSISTANT

## 2023-08-11 PROCEDURE — 1090F PRES/ABSN URINE INCON ASSESS: CPT | Performed by: PHYSICIAN ASSISTANT

## 2023-08-11 PROCEDURE — G8419 CALC BMI OUT NRM PARAM NOF/U: HCPCS | Performed by: PHYSICIAN ASSISTANT

## 2023-08-16 DIAGNOSIS — N63.23 MASS OF LOWER OUTER QUADRANT OF LEFT BREAST: ICD-10-CM

## 2023-08-24 DIAGNOSIS — M41.20 OTHER IDIOPATHIC SCOLIOSIS, UNSPECIFIED SPINAL REGION: ICD-10-CM

## 2023-08-24 DIAGNOSIS — N95.1 MENOPAUSAL STATE: Primary | ICD-10-CM

## 2023-10-20 ENCOUNTER — HOSPITAL ENCOUNTER (OUTPATIENT)
Dept: MAMMOGRAPHY | Age: 63
Discharge: HOME | End: 2023-10-20
Payer: COMMERCIAL

## 2023-10-20 DIAGNOSIS — N95.1 MENOPAUSAL STATE: ICD-10-CM

## 2023-10-20 PROCEDURE — 77080 DXA BONE DENSITY AXIAL: CPT

## 2024-01-19 ASSESSMENT — PATIENT HEALTH QUESTIONNAIRE - PHQ9
SUM OF ALL RESPONSES TO PHQ QUESTIONS 1-9: 0
SUM OF ALL RESPONSES TO PHQ9 QUESTIONS 1 & 2: 0
SUM OF ALL RESPONSES TO PHQ QUESTIONS 1-9: 0
1. LITTLE INTEREST OR PLEASURE IN DOING THINGS: NOT AT ALL
1. LITTLE INTEREST OR PLEASURE IN DOING THINGS: 0
2. FEELING DOWN, DEPRESSED OR HOPELESS: 0
2. FEELING DOWN, DEPRESSED OR HOPELESS: NOT AT ALL
SUM OF ALL RESPONSES TO PHQ9 QUESTIONS 1 & 2: 0

## 2024-01-22 ENCOUNTER — OFFICE VISIT (OUTPATIENT)
Dept: PRIMARY CARE CLINIC | Age: 64
End: 2024-01-22
Payer: COMMERCIAL

## 2024-01-22 VITALS
TEMPERATURE: 97.1 F | HEART RATE: 77 BPM | HEIGHT: 63 IN | SYSTOLIC BLOOD PRESSURE: 124 MMHG | WEIGHT: 153.2 LBS | BODY MASS INDEX: 27.14 KG/M2 | OXYGEN SATURATION: 100 % | DIASTOLIC BLOOD PRESSURE: 72 MMHG | RESPIRATION RATE: 16 BRPM

## 2024-01-22 DIAGNOSIS — Z11.4 SCREENING FOR HIV WITHOUT PRESENCE OF RISK FACTORS: ICD-10-CM

## 2024-01-22 DIAGNOSIS — Z72.89 OTHER PROBLEMS RELATED TO LIFESTYLE: ICD-10-CM

## 2024-01-22 DIAGNOSIS — Z11.59 NEED FOR HEPATITIS C SCREENING TEST: ICD-10-CM

## 2024-01-22 DIAGNOSIS — Z13.220 LIPID SCREENING: ICD-10-CM

## 2024-01-22 DIAGNOSIS — D72.819 LEUKOPENIA, UNSPECIFIED TYPE: ICD-10-CM

## 2024-01-22 DIAGNOSIS — Z00.00 ENCOUNTER FOR WELL ADULT EXAM WITHOUT ABNORMAL FINDINGS: Primary | ICD-10-CM

## 2024-01-22 DIAGNOSIS — E78.5 HYPERLIPIDEMIA, UNSPECIFIED HYPERLIPIDEMIA TYPE: Primary | ICD-10-CM

## 2024-01-22 DIAGNOSIS — L71.9 ROSACEA: ICD-10-CM

## 2024-01-22 LAB
ABSOLUTE IMMATURE GRANULOCYTE: <0.03 K/UL (ref 0–0.58)
ALBUMIN SERPL-MCNC: 4.1 G/DL (ref 3.5–5.2)
ALP BLD-CCNC: 77 U/L (ref 35–104)
ALT SERPL-CCNC: 14 U/L (ref 0–32)
ANION GAP SERPL CALCULATED.3IONS-SCNC: 16 MMOL/L (ref 7–16)
AST SERPL-CCNC: 24 U/L (ref 0–31)
BASOPHILS ABSOLUTE: 0.04 K/UL (ref 0–0.2)
BASOPHILS RELATIVE PERCENT: 1 % (ref 0–2)
BILIRUB SERPL-MCNC: 0.2 MG/DL (ref 0–1.2)
BUN BLDV-MCNC: 14 MG/DL (ref 6–23)
CALCIUM SERPL-MCNC: 9.5 MG/DL (ref 8.6–10.2)
CHLORIDE BLD-SCNC: 105 MMOL/L (ref 98–107)
CHOLESTEROL: 235 MG/DL
CO2: 23 MMOL/L (ref 22–29)
CREAT SERPL-MCNC: 1 MG/DL (ref 0.5–1)
EOSINOPHILS ABSOLUTE: 0.08 K/UL (ref 0.05–0.5)
EOSINOPHILS RELATIVE PERCENT: 2 % (ref 0–6)
GFR SERPL CREATININE-BSD FRML MDRD: >60 ML/MIN/1.73M2
GLUCOSE BLD-MCNC: 88 MG/DL (ref 74–99)
HCT VFR BLD CALC: 39.4 % (ref 34–48)
HDLC SERPL-MCNC: 55 MG/DL
HEMOGLOBIN: 12.7 G/DL (ref 11.5–15.5)
IMMATURE GRANULOCYTES: 0 % (ref 0–5)
LDL CHOLESTEROL: 156 MG/DL
LYMPHOCYTES ABSOLUTE: 0.94 K/UL (ref 1.5–4)
LYMPHOCYTES RELATIVE PERCENT: 23 % (ref 20–42)
MCH RBC QN AUTO: 29.3 PG (ref 26–35)
MCHC RBC AUTO-ENTMCNC: 32.2 G/DL (ref 32–34.5)
MCV RBC AUTO: 91 FL (ref 80–99.9)
MONOCYTES ABSOLUTE: 0.91 K/UL (ref 0.1–0.95)
MONOCYTES RELATIVE PERCENT: 22 % (ref 2–12)
NEUTROPHILS ABSOLUTE: 2.17 K/UL (ref 1.8–7.3)
NEUTROPHILS RELATIVE PERCENT: 52 % (ref 43–80)
PDW BLD-RTO: 13.9 % (ref 11.5–15)
PLATELET # BLD: 238 K/UL (ref 130–450)
PMV BLD AUTO: 11.2 FL (ref 7–12)
POTASSIUM SERPL-SCNC: 4.4 MMOL/L (ref 3.5–5)
RBC # BLD: 4.33 M/UL (ref 3.5–5.5)
SODIUM BLD-SCNC: 144 MMOL/L (ref 132–146)
TOTAL PROTEIN: 6.8 G/DL (ref 6.4–8.3)
TRIGL SERPL-MCNC: 119 MG/DL
VLDLC SERPL CALC-MCNC: 24 MG/DL
WBC # BLD: 4.2 K/UL (ref 4.5–11.5)

## 2024-01-22 PROCEDURE — 36415 COLL VENOUS BLD VENIPUNCTURE: CPT | Performed by: PHYSICIAN ASSISTANT

## 2024-01-22 PROCEDURE — G8484 FLU IMMUNIZE NO ADMIN: HCPCS | Performed by: PHYSICIAN ASSISTANT

## 2024-01-22 PROCEDURE — 99397 PER PM REEVAL EST PAT 65+ YR: CPT | Performed by: PHYSICIAN ASSISTANT

## 2024-01-22 RX ORDER — METRONIDAZOLE 7.5 MG/G
1 GEL TOPICAL 2 TIMES DAILY
Qty: 45 G | Refills: 2 | Status: SHIPPED | OUTPATIENT
Start: 2024-01-22

## 2024-01-22 RX ORDER — METRONIDAZOLE 7.5 MG/G
GEL TOPICAL
Qty: 45 G | Refills: 2 | Status: SHIPPED
Start: 2024-01-22 | End: 2024-01-22 | Stop reason: SDUPTHER

## 2024-01-22 NOTE — PROGRESS NOTES
Well Adult Note  Name: Ct Iqbal Today’s Date: 2024   MRN: [id number] Sex: Female   Age: 63 y.o. Ethnicity: Non- / Non    : 1960 Race: White (non-)      Ct Iqbal is here for well adult exam.  History:  She has no acute complaints.   She is up to date on mammogram and dexa scan.  She would like to do screening labs.  She retired this Dec.   She needs metrogel refills.    Review of Systems   All other systems reviewed and are negative.      No Known Allergies      Prior to Visit Medications    Medication Sig Taking? Authorizing Provider   metroNIDAZOLE (METROGEL) 0.75 % gel metronidazole 0.75 % topical gel Yes Kimberly Hough PA-C   penicillin v potassium (VEETID) 500 MG tablet Take 1 tablet by mouth 3 times daily  Patient not taking: Reported on 2023  Kimberly Hough PA-C         Past Medical History:   Diagnosis Date    Acquired scoliosis        Past Surgical History:   Procedure Laterality Date    APPENDECTOMY       SECTION      SKIN CANCER DESTRUCTION           Family History   Problem Relation Age of Onset    Alzheimer's Disease Father     Pseudotumor Cerebri Sister        Social History     Tobacco Use    Smoking status: Never     Passive exposure: Never    Smokeless tobacco: Never   Vaping Use    Vaping Use: Never used   Substance Use Topics    Alcohol use: Never       Objective     Vital Signs  /72   Pulse 77   Temp 97.1 °F (36.2 °C)   Resp 16   Ht 1.588 m (5' 2.52\")   Wt 69.5 kg (153 lb 3.2 oz)   SpO2 100%   BMI 27.56 kg/m²   Wt Readings from Last 3 Encounters:   24 69.5 kg (153 lb 3.2 oz)   23 70.3 kg (155 lb)   22 70.9 kg (156 lb 4.8 oz)       Waist Circumference  There were no vitals filed for this visit.    Physical Exam  Constitutional:       General: She is not in acute distress.     Appearance: Normal appearance. She is well-developed.   HENT:      Head: Normocephalic and atraumatic.      Right Ear: External ear normal.

## 2024-01-23 LAB
HEPATITIS C ANTIBODY: NONREACTIVE
HIV AG/AB: NONREACTIVE

## 2024-07-15 DIAGNOSIS — N63.23 MASS OF LOWER OUTER QUADRANT OF LEFT BREAST: Primary | ICD-10-CM

## 2024-07-15 DIAGNOSIS — Z12.31 BREAST CANCER SCREENING BY MAMMOGRAM: ICD-10-CM

## 2024-08-06 ENCOUNTER — APPOINTMENT (RX ONLY)
Dept: URBAN - METROPOLITAN AREA CLINIC 12 | Facility: CLINIC | Age: 64
Setting detail: DERMATOLOGY
End: 2024-08-06

## 2024-08-06 DIAGNOSIS — L81.4 OTHER MELANIN HYPERPIGMENTATION: ICD-10-CM

## 2024-08-06 DIAGNOSIS — D22 MELANOCYTIC NEVI: ICD-10-CM

## 2024-08-06 DIAGNOSIS — L82.1 OTHER SEBORRHEIC KERATOSIS: ICD-10-CM

## 2024-08-06 DIAGNOSIS — Z85.828 PERSONAL HISTORY OF OTHER MALIGNANT NEOPLASM OF SKIN: ICD-10-CM

## 2024-08-06 DIAGNOSIS — L28.0 LICHEN SIMPLEX CHRONICUS: ICD-10-CM | Status: INADEQUATELY CONTROLLED

## 2024-08-06 DIAGNOSIS — D18.0 HEMANGIOMA: ICD-10-CM

## 2024-08-06 PROBLEM — D48.5 NEOPLASM OF UNCERTAIN BEHAVIOR OF SKIN: Status: ACTIVE | Noted: 2024-08-06

## 2024-08-06 PROBLEM — D18.01 HEMANGIOMA OF SKIN AND SUBCUTANEOUS TISSUE: Status: ACTIVE | Noted: 2024-08-06

## 2024-08-06 PROBLEM — D22.5 MELANOCYTIC NEVI OF TRUNK: Status: ACTIVE | Noted: 2024-08-06

## 2024-08-06 PROCEDURE — 99214 OFFICE O/P EST MOD 30 MIN: CPT | Mod: 25

## 2024-08-06 PROCEDURE — ? FULL BODY SKIN EXAM

## 2024-08-06 PROCEDURE — ? BIOPSY BY SHAVE METHOD

## 2024-08-06 PROCEDURE — ? PRESCRIPTION

## 2024-08-06 PROCEDURE — ? COUNSELING

## 2024-08-06 PROCEDURE — 11102 TANGNTL BX SKIN SINGLE LES: CPT

## 2024-08-06 PROCEDURE — ? TREATMENT REGIMEN

## 2024-08-06 RX ORDER — MUPIROCIN 20 MG/G
OINTMENT TOPICAL
Qty: 22 | Refills: 5 | Status: ERX | COMMUNITY
Start: 2024-08-06

## 2024-08-06 RX ORDER — TRIAMCINOLONE ACETONIDE 1 MG/G
OINTMENT TOPICAL
Qty: 453.6 | Refills: 4 | Status: ERX | COMMUNITY
Start: 2024-08-06

## 2024-08-06 RX ADMIN — MUPIROCIN: 20 OINTMENT TOPICAL at 00:00

## 2024-08-06 RX ADMIN — TRIAMCINOLONE ACETONIDE: 1 OINTMENT TOPICAL at 00:00

## 2024-08-06 ASSESSMENT — LOCATION ZONE DERM
LOCATION ZONE: ARM
LOCATION ZONE: TRUNK
LOCATION ZONE: FACE
LOCATION ZONE: LEG

## 2024-08-06 ASSESSMENT — LOCATION SIMPLE DESCRIPTION DERM
LOCATION SIMPLE: LEFT FOREHEAD
LOCATION SIMPLE: CHEST
LOCATION SIMPLE: LEFT PRETIBIAL REGION
LOCATION SIMPLE: RIGHT POSTERIOR UPPER ARM
LOCATION SIMPLE: LEFT POSTERIOR UPPER ARM
LOCATION SIMPLE: RIGHT UPPER BACK
LOCATION SIMPLE: LEFT UPPER BACK

## 2024-08-06 ASSESSMENT — LOCATION DETAILED DESCRIPTION DERM
LOCATION DETAILED: LEFT MEDIAL FOREHEAD
LOCATION DETAILED: RIGHT INFERIOR UPPER BACK
LOCATION DETAILED: RIGHT SUPERIOR UPPER BACK
LOCATION DETAILED: LEFT PROXIMAL PRETIBIAL REGION
LOCATION DETAILED: UPPER STERNUM
LOCATION DETAILED: RIGHT PROXIMAL POSTERIOR UPPER ARM
LOCATION DETAILED: LEFT PROXIMAL POSTERIOR UPPER ARM
LOCATION DETAILED: RIGHT MEDIAL SUPERIOR CHEST
LOCATION DETAILED: LEFT SUPERIOR MEDIAL UPPER BACK

## 2024-08-06 NOTE — PROCEDURE: COUNSELING
Detail Level: Detailed
Sunscreen Recommendations: sunscreen with zinc/titanium\\n\\nwear wide rim hat
Detail Level: Generalized
Detail Level: Zone

## 2024-08-21 LAB — MAMMOGRAPHY, EXTERNAL: NORMAL

## 2025-05-28 ENCOUNTER — TELEPHONE (OUTPATIENT)
Dept: FAMILY MEDICINE CLINIC | Age: 65
End: 2025-05-28

## 2025-05-28 NOTE — TELEPHONE ENCOUNTER
----- Message from Lyn WELLS sent at 5/28/2025  9:41 AM EDT -----  Regarding: [facility] Appointment Request  [facility] Appointment Request    Patient needs appointment for [facility] Appointment Type: New to Provider.    Patient Requested Dates(s): Any day with in this month or July   Patient Requested Time: Anytime   Provider Name: Pooja Lawrence MD      Reason for Appointment Request: New Patient - No appointments available during search  --------------------------------------------------------------------------------------------------------------------------    Relationship to Patient: Self     Call Back Information: OK to leave message on voicemail  Preferred Call Back Number: [phone]

## 2025-08-13 ENCOUNTER — PATIENT MESSAGE (OUTPATIENT)
Dept: PRIMARY CARE CLINIC | Age: 65
End: 2025-08-13

## 2025-08-13 RX ORDER — ACYCLOVIR 50 MG/G
OINTMENT TOPICAL
Qty: 15 G | Refills: 1 | Status: SHIPPED | OUTPATIENT
Start: 2025-08-13 | End: 2025-08-20

## 2025-08-13 RX ORDER — METRONIDAZOLE TOPICAL 7.5 MG/G
1 GEL TOPICAL 2 TIMES DAILY
Qty: 45 G | Refills: 0 | Status: SHIPPED | OUTPATIENT
Start: 2025-08-13

## 2025-08-23 ENCOUNTER — PATIENT MESSAGE (OUTPATIENT)
Dept: PRIMARY CARE CLINIC | Age: 65
End: 2025-08-23